# Patient Record
Sex: MALE | Race: WHITE | NOT HISPANIC OR LATINO | Employment: UNEMPLOYED | URBAN - METROPOLITAN AREA
[De-identification: names, ages, dates, MRNs, and addresses within clinical notes are randomized per-mention and may not be internally consistent; named-entity substitution may affect disease eponyms.]

---

## 2023-06-20 ENCOUNTER — APPOINTMENT (EMERGENCY)
Dept: RADIOLOGY | Facility: HOSPITAL | Age: 43
End: 2023-06-20
Payer: COMMERCIAL

## 2023-06-20 ENCOUNTER — HOSPITAL ENCOUNTER (EMERGENCY)
Facility: HOSPITAL | Age: 43
Discharge: HOME | End: 2023-06-20
Attending: EMERGENCY MEDICINE
Payer: COMMERCIAL

## 2023-06-20 VITALS
HEIGHT: 72 IN | TEMPERATURE: 97.7 F | RESPIRATION RATE: 18 BRPM | BODY MASS INDEX: 28.13 KG/M2 | HEART RATE: 63 BPM | WEIGHT: 207.67 LBS | SYSTOLIC BLOOD PRESSURE: 127 MMHG | OXYGEN SATURATION: 99 % | DIASTOLIC BLOOD PRESSURE: 77 MMHG

## 2023-06-20 DIAGNOSIS — S50.01XA CONTUSION OF RIGHT ELBOW, INITIAL ENCOUNTER: Primary | ICD-10-CM

## 2023-06-20 DIAGNOSIS — M70.21 OLECRANON BURSITIS OF RIGHT ELBOW: ICD-10-CM

## 2023-06-20 PROCEDURE — 73080 X-RAY EXAM OF ELBOW: CPT

## 2023-06-20 RX ADMIN — DICLOFENAC SODIUM 2 G: 10 GEL TOPICAL at 15:59

## 2023-06-20 NOTE — ED PROVIDER NOTES
Pt Name: Wilda Callejas  MRN: [id number]  [place] 1980  Age/Sex: 40 y o  male  Date of evaluation: 6/20/2023  PCP: No primary care provider on file  CHIEF COMPLAINT    Chief Complaint   Patient presents with   • Elbow Injury     PT reports banging right elbow this am on a door and has been having pain and limited ROM since         HPI    40 y o  male presenting with right elbow pain  Patient states that he has struck his right elbow on things repeatedly over the past several months to years, this morning struck it on a door  He noted immediate pain, this pain is dull, moderate intensity, worse with moving the elbow and better at rest   He states that he want to get the arm checked out today due to the current pain as well as multiple prior injuries  He also notes some swelling in the area  He denies numbness, weakness, fever, other symptoms  HPI      Past Medical and Surgical History    History reviewed  No pertinent past medical history  History reviewed  No pertinent surgical history  History reviewed  No pertinent family history  Allergies    No Known Allergies    Home Medications    Prior to Admission medications    Not on File           Review of Systems    Review of Systems   Constitutional: Negative for appetite change, chills and diaphoresis  HENT: Negative for drooling, facial swelling, trouble swallowing and voice change  Respiratory: Negative for apnea, shortness of breath and wheezing  Cardiovascular: Negative for chest pain and leg swelling  Gastrointestinal: Negative for abdominal distention, abdominal pain, diarrhea, nausea and vomiting  Genitourinary: Negative for dysuria and urgency  Musculoskeletal: Positive for arthralgias and joint swelling  Negative for back pain, gait problem and neck pain  Skin: Negative for color change, rash and wound  Neurological: Negative for seizures, speech difficulty, weakness and headaches     Psychiatric/Behavioral: Negative for agitation, behavioral problems and dysphoric mood  The patient is not nervous/anxious  All other systems reviewed and negative  Physical Exam      ED Triage Vitals [06/20/23 1400]   Temperature Pulse Respirations Blood Pressure SpO2   97 7 °F (36 5 °C) 63 18 127/77 99 %      Temp Source Heart Rate Source Patient Position - Orthostatic VS BP Location FiO2 (%)   Tympanic Monitor -- Left arm --      Pain Score       --               Physical Exam  Vitals and nursing note reviewed  Constitutional:       Appearance: He is well-developed  HENT:      Head: Normocephalic and atraumatic  Right Ear: External ear normal       Left Ear: External ear normal       Nose: Nose normal  No congestion or rhinorrhea  Mouth/Throat:      Mouth: Mucous membranes are moist       Pharynx: Oropharynx is clear  Eyes:      Conjunctiva/sclera: Conjunctivae normal       Pupils: Pupils are equal, round, and reactive to light  Neck:      Trachea: No tracheal deviation  Cardiovascular:      Rate and Rhythm: Normal rate and regular rhythm  Heart sounds: Normal heart sounds  No murmur heard  Pulmonary:      Effort: Pulmonary effort is normal  No respiratory distress  Breath sounds: Normal breath sounds  No stridor  No wheezing or rales  Abdominal:      General: There is no distension  Palpations: Abdomen is soft  Tenderness: There is no abdominal tenderness  There is no guarding or rebound  Musculoskeletal:         General: Swelling and tenderness present  No deformity  Normal range of motion  Cervical back: Normal range of motion and neck supple  Comments: Bruising noted over the olecranon process with tenderness to palpation but no deformity, small swelling of the olecranon bursa   Compartments soft, strength sensation pulse and cap refill intact distal, full active and passive range of motion of the elbow, no pain with axial loading   Skin:     General: Skin is warm and dry  Capillary Refill: Capillary refill takes less than 2 seconds  Findings: No rash  Neurological:      Mental Status: He is alert and oriented to person, place, and time  Psychiatric:         Behavior: Behavior normal          Thought Content: Thought content normal          Judgment: Judgment normal               Diagnostic Results      Labs:    Results Reviewed     None          All labs reviewed and utilized in the medical decision making process    Radiology:    XR elbow 3+ vw RIGHT   ED Interpretation   No acute fracture or dislocation  All radiology studies independently viewed by me and interpreted by the radiologist     Procedure    Procedures        ED Course of Care and Re-Assessments      Plain films reassuring, treated with Voltaren gel for contusion  Medications   Diclofenac Sodium (VOLTAREN) 1 % topical gel 2 g (has no administration in time range)           FINAL IMPRESSION    Final diagnoses:   Olecranon bursitis of right elbow   Contusion of right elbow, initial encounter         DISPOSITION/PLAN    Presentation as above felt most consistent of mild olecranon bursitis of the right elbow, possibly due to chronic trauma is related by patient, suspect acute contusion of the elbow as well  Low suspicion for unstable fracture dislocation, septic arthritis, septic bursitis, compartment syndrome, significant neurovascular disruption, other acute threat to life or limb  Treated symptomatically, discharged with strict return precautions, follow-up with primary care doctor    Time reflects when diagnosis was documented in both MDM as applicable and the Disposition within this note     Time User Action Codes Description Comment    6/20/2023  3:24 PM Del Cowden Add [M70 21] Olecranon bursitis of right elbow     6/20/2023  3:24 PM Del Cowden Add [S50 01XA] Contusion of right elbow, initial encounter     6/20/2023  3:24 PM Del Cowden Modify [M70 21] "Olecranon bursitis of right elbow     6/20/2023  3:24 PM Meliza Krueger Modify [S50 01XA] Contusion of right elbow, initial encounter       ED Disposition     ED Disposition   Discharge    Condition   Stable    Date/Time   Tue Jun 20, 2023  3:24 PM    Comment   Kimberley Madrid discharge to home/self care  Follow-up Information     Follow up With Specialties Details Why Contact Info Additional [address] [facility] Emergency Department Emergency Medicine Go to  If symptoms worsen [address] [facility] [address] [facility] Emergency Department, [address] [place], [place], 201 [facility] Orthopedic Surgery Call in 1 day To schedule close followup for your elbow pain [address] [facility] 42 [id number]  [address] [facility], [address] [address] [place], [place], 243 [facility]            PATIENT REFERRED TO:    [facility] Emergency Department  [address] [facility] [id number] [phone]  Go to   If symptoms worsen    [address] [facility]  [address] Karthik Alonso 91  [phone]  Call in 1 day  To schedule close followup for your elbow pain      DISCHARGE MEDICATIONS:    Patient's Medications   Discharge Prescriptions    DICLOFENAC SODIUM (VOLTAREN) 1 %    Apply 2 g topically 4 (four) times a day       Start Date: 6/20/2023 End Date: --       Order Dose: 2 g       Quantity: 150 g    Refills: 0                Raji Hull MD    Portions of the record may have been created with voice recognition software  Occasional wrong word or \"sound alike\" substitutions may have occurred due to the inherent limitations of voice recognition software    Please read the " chart carefully and recognize, using context, where substitutions have occurred     Kristina Godinez MD  06/20/23 8821

## 2023-09-01 ENCOUNTER — HOSPITAL ENCOUNTER (EMERGENCY)
Facility: HOSPITAL | Age: 43
Discharge: HOME | End: 2023-09-01
Attending: EMERGENCY MEDICINE
Payer: COMMERCIAL

## 2023-09-01 VITALS
OXYGEN SATURATION: 94 % | TEMPERATURE: 98.6 F | DIASTOLIC BLOOD PRESSURE: 81 MMHG | SYSTOLIC BLOOD PRESSURE: 129 MMHG | RESPIRATION RATE: 14 BRPM | HEART RATE: 85 BPM

## 2023-09-01 DIAGNOSIS — F10.929 ALCOHOL INTOXICATION (HCC): Primary | ICD-10-CM

## 2023-09-01 PROCEDURE — 96372 THER/PROPH/DIAG INJ SC/IM: CPT

## 2023-09-01 PROCEDURE — 99284 EMERGENCY DEPT VISIT MOD MDM: CPT

## 2023-09-01 PROCEDURE — 99284 EMERGENCY DEPT VISIT MOD MDM: CPT | Performed by: EMERGENCY MEDICINE

## 2023-09-01 RX ORDER — HALOPERIDOL 5 MG/ML
5 INJECTION INTRAMUSCULAR ONCE
Status: COMPLETED | OUTPATIENT
Start: 2023-09-01 | End: 2023-09-01

## 2023-09-01 RX ADMIN — HALOPERIDOL LACTATE 5 MG: 5 INJECTION, SOLUTION INTRAMUSCULAR at 10:57

## 2023-09-01 NOTE — ED NOTES
Pt combative with staff prior to haldol administration, said "I'm this close to flipping out"    Security at bedside for medication admin, pt not resting without distress. In visible view of staff, side rails raised for pt safety.       Humera Guillaume RN  09/01/23 [id number]

## 2023-09-01 NOTE — ED PROVIDER NOTES
History  Chief Complaint   Patient presents with   • Alcohol Intoxication     Pt arrives EMS from [facility], staff there called due to pt being visibly intoxicated and being suspicious of other drug use. Pt combative for EMS     43 yo male brought to ED from local rehab facility where he presented for etoh rehab but was too intoxicated for intake. He arrives to ED unable to provide a history and he is somewhat combative and agitated requiring chemical restraint for the safety of himself and other ED patients and personnel. Prior to Admission Medications   Prescriptions Last Dose Informant Patient Reported? Taking? Diclofenac Sodium (VOLTAREN) 1 %   No No   Sig: Apply 2 g topically 4 (four) times a day      Facility-Administered Medications: None       No past medical history on file. No past surgical history on file. No family history on file. I have reviewed and agree with the history as documented. E-Cigarette/Vaping     E-Cigarette/Vaping Substances          Review of Systems   Unable to perform ROS: Mental status change       Physical Exam  Physical Exam  Constitutional:       Comments: Slightly agitated, intermittently cooperative. Unable to answer questions. HENT:      Head: Normocephalic and atraumatic. Mouth/Throat:      Mouth: Mucous membranes are moist.      Pharynx: Oropharynx is clear. Eyes:      Pupils: Pupils are equal, round, and reactive to light. Cardiovascular:      Rate and Rhythm: Normal rate. Pulses: Normal pulses. Heart sounds: No murmur heard. Pulmonary:      Effort: Pulmonary effort is normal. No respiratory distress. Abdominal:      General: There is no distension. Tenderness: There is no abdominal tenderness. There is no guarding. Musculoskeletal:         General: No swelling, tenderness or signs of injury. Normal range of motion. Cervical back: Normal range of motion. No rigidity. Neurological:      General: No focal deficit present. Mental Status: He is alert. Cranial Nerves: No cranial nerve deficit. Motor: No weakness. Coordination: Coordination normal.      Comments: Moves all extremities equally. Intermittently follows commands. Vital Signs  ED Triage Vitals   Temperature Pulse Respirations Blood Pressure SpO2   09/01/23 1057 09/01/23 1054 09/01/23 1053 09/01/23 1053 09/01/23 1053   98.6 °F (37 °C) 87 20 152/91 96 %      Temp Source Heart Rate Source Patient Position - Orthostatic VS BP Location FiO2 (%)   09/01/23 1057 09/01/23 1053 -- 09/01/23 1053 --   Oral Monitor  Right arm       Pain Score       --                  Vitals:    09/01/23 1054 09/01/23 1400 09/01/23 1530 09/01/23 1845   BP:  138/89 128/85 129/81   Pulse: 87 85 81 85         Visual Acuity      ED Medications  Medications   haloperidol lactate (HALDOL) injection 5 mg (5 mg Intramuscular Given 9/1/23 1057)       Diagnostic Studies  Results Reviewed     None                 No orders to display              Procedures  Procedures         ED Course  ED Course as of 09/02/23 1702   Fri Sep 01, 2023   1117 Reexamined. Asleep. No distress. Normal RR and WOB. 1248 Reevaluated. Still sleeping.    1651 Awake, alert, ambulatory, clinically sober. MDM    Disposition  Final diagnoses:   Alcohol intoxication ([address])     Time reflects when diagnosis was documented in both MDM as applicable and the Disposition within this note     Time User Action Codes Description Comment    9/1/2023  4:51 PM Janine Velasquez Add [F10.929] Alcohol intoxication [facility])       ED Disposition     ED Disposition   Discharge    Condition   Stable    Date/Time   Fri Sep 1, 2023  4:51 PM    Comment   Ebony Rolling discharge to home/self care.                Follow-up Information    None         Discharge Medication List as of 9/1/2023  4:52 PM      CONTINUE these medications which have NOT CHANGED    Details   Diclofenac Sodium (VOLTAREN) 1 % Apply 2 g topically 4 (four) times a day, Starting Tue 6/20/2023, Print             No discharge procedures on file.     PDMP Review     None          ED Provider  Electronically Signed by           Jabari Mata MD  09/02/23 4228